# Patient Record
Sex: FEMALE
[De-identification: names, ages, dates, MRNs, and addresses within clinical notes are randomized per-mention and may not be internally consistent; named-entity substitution may affect disease eponyms.]

---

## 2017-01-05 ENCOUNTER — RX ONLY (RX ONLY)
Age: 47
End: 2017-01-05

## 2017-01-05 RX ORDER — SODIUM SULFACETAMIDE AND SULFUR 100; 20 MG/G; MG/G
SOLUTION TOPICAL
Qty: 1 | Refills: 3 | Status: ERX | COMMUNITY
Start: 2017-01-05

## 2017-01-05 RX ORDER — TAZAROTENE 1 MG/G
CREAM CUTANEOUS
Qty: 1 | Refills: 3 | Status: ERX | COMMUNITY
Start: 2017-01-05

## 2017-02-07 ENCOUNTER — APPOINTMENT (OUTPATIENT)
Age: 47
Setting detail: DERMATOLOGY
End: 2017-02-20

## 2017-02-07 DIAGNOSIS — L70.0 ACNE VULGARIS: ICD-10-CM

## 2017-02-07 DIAGNOSIS — L98.8 OTHER SPECIFIED DISORDERS OF THE SKIN AND SUBCUTANEOUS TISSUE: ICD-10-CM

## 2017-02-07 PROCEDURE — OTHER ORDER TESTS: OTHER

## 2017-02-07 PROCEDURE — OTHER ISOTRETINOIN MONITORING: OTHER

## 2017-02-07 PROCEDURE — OTHER MIPS QUALITY: OTHER

## 2017-02-07 PROCEDURE — 81025 URINE PREGNANCY TEST: CPT

## 2017-02-07 PROCEDURE — OTHER URINE PREGNANCY TEST: OTHER

## 2017-02-07 PROCEDURE — OTHER COUNSELING: OTHER

## 2017-02-07 PROCEDURE — 99214 OFFICE O/P EST MOD 30 MIN: CPT

## 2017-02-07 ASSESSMENT — LOCATION DETAILED DESCRIPTION DERM: LOCATION DETAILED: LEFT INFERIOR MEDIAL FOREHEAD

## 2017-02-07 ASSESSMENT — LOCATION ZONE DERM: LOCATION ZONE: FACE

## 2017-02-07 ASSESSMENT — LOCATION SIMPLE DESCRIPTION DERM: LOCATION SIMPLE: LEFT FOREHEAD

## 2017-02-07 NOTE — PROCEDURE: MIPS QUALITY
Quality 110: Preventive Care And Screening: Influenza Immunization: Influenza immunization was not ordered or administered, reason not given
Quality 431: Preventive Care And Screening: Unhealthy Alcohol Use - Screening: Patient screened for unhealthy alcohol use using a single question and scores less than 2 times per year
Quality 226: Preventive Care And Screening: Tobacco Use: Screening And Cessation Intervention: Patient screened for tobacco and never smoked
Quality 130: Documentation Of Current Medications In The Medical Record: Current Medications Documented
Detail Level: Detailed

## 2017-02-07 NOTE — PROCEDURE: ISOTRETINOIN MONITORING
Most Recent Cbc (Optional): Wnl
Dosing Month 1 (Required For Cumulative Dosing): 40mg Daily
Most Recent Beta Hcg (Optional): Neg
Months Of Therapy Completed: 1
Pounds Preamble Statement (Weight Entered In Details Tab): Reported Weight in pounds:
Detail Level: Zone
Display Individual Monthly Dosage In The Note (If Yes Will Display All Dosages Which Are Not N/A): yes
Weight Units: pounds

## 2017-03-07 ENCOUNTER — APPOINTMENT (OUTPATIENT)
Age: 47
Setting detail: DERMATOLOGY
End: 2017-03-09

## 2017-03-07 DIAGNOSIS — L70.0 ACNE VULGARIS: ICD-10-CM

## 2017-03-07 PROCEDURE — 81025 URINE PREGNANCY TEST: CPT

## 2017-03-07 PROCEDURE — OTHER MIPS QUALITY: OTHER

## 2017-03-07 PROCEDURE — OTHER ORDER TESTS: OTHER

## 2017-03-07 PROCEDURE — OTHER COUNSELING: OTHER

## 2017-03-07 PROCEDURE — OTHER URINE PREGNANCY TEST: OTHER

## 2017-03-07 PROCEDURE — 99214 OFFICE O/P EST MOD 30 MIN: CPT

## 2017-03-07 PROCEDURE — OTHER ISOTRETINOIN MONITORING: OTHER

## 2017-03-07 ASSESSMENT — LOCATION ZONE DERM: LOCATION ZONE: FACE

## 2017-03-07 ASSESSMENT — LOCATION SIMPLE DESCRIPTION DERM: LOCATION SIMPLE: LEFT FOREHEAD

## 2017-03-07 ASSESSMENT — LOCATION DETAILED DESCRIPTION DERM: LOCATION DETAILED: LEFT INFERIOR MEDIAL FOREHEAD

## 2017-03-07 NOTE — PROCEDURE: MIPS QUALITY
Quality 431: Preventive Care And Screening: Unhealthy Alcohol Use - Screening: Patient screened for unhealthy alcohol use using a single question and scores less than 2 times per year
Quality 130: Documentation Of Current Medications In The Medical Record: Current Medications Documented
Quality 226: Preventive Care And Screening: Tobacco Use: Screening And Cessation Intervention: Patient screened for tobacco and never smoked
Detail Level: Detailed
Quality 110: Preventive Care And Screening: Influenza Immunization: Influenza Immunization Administered during Influenza season

## 2017-03-07 NOTE — PROCEDURE: ISOTRETINOIN MONITORING
Detail Level: Zone
Most Recent Cholesterol (Optional): Wnl
Pounds Preamble Statement (Weight Entered In Details Tab): Reported Weight in pounds:
Months Of Therapy Completed: 1
Most Recent Beta Hcg (Optional): Neg
Comments: Increase dose to 40 mg bid
Weight Units: pounds
Dosing Month 1 (Required For Cumulative Dosing): 40mg Daily
Display Individual Monthly Dosage In The Note (If Yes Will Display All Dosages Which Are Not N/A): yes

## 2017-04-11 ENCOUNTER — APPOINTMENT (OUTPATIENT)
Age: 47
Setting detail: DERMATOLOGY
End: 2017-04-20

## 2017-04-11 DIAGNOSIS — L27.1 LOCALIZED SKIN ERUPTION DUE TO DRUGS AND MEDICAMENTS TAKEN INTERNALLY: ICD-10-CM

## 2017-04-11 DIAGNOSIS — L70.0 ACNE VULGARIS: ICD-10-CM

## 2017-04-11 PROCEDURE — 99213 OFFICE O/P EST LOW 20 MIN: CPT

## 2017-04-11 PROCEDURE — OTHER ORDER TESTS: OTHER

## 2017-04-11 PROCEDURE — OTHER PRESCRIPTION: OTHER

## 2017-04-11 PROCEDURE — OTHER COUNSELING: OTHER

## 2017-04-11 PROCEDURE — OTHER ISOTRETINOIN MONITORING: OTHER

## 2017-04-11 PROCEDURE — OTHER MIPS QUALITY: OTHER

## 2017-04-11 RX ORDER — TRIAMCINOLONE ACETONIDE 1 MG/G
CREAM TOPICAL
Qty: 30 | Refills: 3 | Status: ERX | COMMUNITY
Start: 2017-04-11

## 2017-04-11 NOTE — PROCEDURE: MIPS QUALITY
Detail Level: Detailed
Quality 226: Preventive Care And Screening: Tobacco Use: Screening And Cessation Intervention: Patient screened for tobacco and never smoked
Quality 431: Preventive Care And Screening: Unhealthy Alcohol Use - Screening: Patient screened for unhealthy alcohol use using a single question and scores less than 2 times per year
Quality 110: Preventive Care And Screening: Influenza Immunization: Influenza Immunization Administered during Influenza season
Quality 130: Documentation Of Current Medications In The Medical Record: Current Medications Documented

## 2017-04-11 NOTE — PROCEDURE: ISOTRETINOIN MONITORING
Months Of Therapy Completed: 2
Dosing Month 1 (Required For Cumulative Dosing): 40mg Daily
Most Recent Lfts (Optional): Wnl
Kilograms Preamble Statement (Weight Entered In Details Tab): Reported Weight in pounds:
Display Individual Monthly Dosage In The Note (If Yes Will Display All Dosages Which Are Not N/A): no
Most Recent Beta Hcg (Optional): Negative
Dosing Month 2 (Required For Cumulative Dosing): 80mg Daily
Detail Level: Zone
Weight Units: pounds

## 2017-04-12 RX ORDER — ISOTRETINOIN 40 MG/1
CAPSULE, LIQUID FILLED ORAL
Qty: 60 | Refills: 0 | Status: ERX | COMMUNITY
Start: 2017-04-12

## 2017-05-09 ENCOUNTER — APPOINTMENT (OUTPATIENT)
Age: 47
Setting detail: DERMATOLOGY
End: 2017-05-10

## 2017-05-09 DIAGNOSIS — L70.0 ACNE VULGARIS: ICD-10-CM

## 2017-05-09 PROCEDURE — OTHER ISOTRETINOIN MONITORING: OTHER

## 2017-05-09 PROCEDURE — OTHER MIPS QUALITY: OTHER

## 2017-05-09 PROCEDURE — OTHER COUNSELING: OTHER

## 2017-05-09 PROCEDURE — OTHER PRESCRIPTION: OTHER

## 2017-05-09 PROCEDURE — 99213 OFFICE O/P EST LOW 20 MIN: CPT

## 2017-05-09 RX ORDER — ISOTRETINOIN 40 MG/1
CAPSULE, LIQUID FILLED ORAL
Qty: 60 | Refills: 0 | Status: ERX

## 2017-05-09 NOTE — PROCEDURE: ISOTRETINOIN MONITORING
Most Recent Cbc (Optional): Wnl
Dosing Month 2 (Required For Cumulative Dosing): 80mg Daily
Weight Units: pounds
Display Individual Monthly Dosage In The Note (If Yes Will Display All Dosages Which Are Not N/A): no
Dosing Month 1 (Required For Cumulative Dosing): 40mg Daily
Kilograms Preamble Statement (Weight Entered In Details Tab): Reported Weight in pounds:
Detail Level: Zone
Most Recent Beta Hcg (Optional): Negative
Months Of Therapy Completed: 3

## 2017-05-09 NOTE — PROCEDURE: MIPS QUALITY
Quality 134: Screening For Clinical Depression And Follow-Up Plan: Depression Screening not documented, reason not given
Quality 130: Documentation Of Current Medications In The Medical Record: Current Medications Documented
Quality 155: Falls Plan Of Care: Plan of Care not Documented, Reason not Otherwise Specified
Quality 154 Part A: Falls: Risk Assessment (Should Be Reported With Measure 155.): Falls risk assessment not completed, reason not otherwise specified
Quality 110: Preventive Care And Screening: Influenza Immunization: Influenza Immunization Administered during Influenza season
Quality 226: Preventive Care And Screening: Tobacco Use: Screening And Cessation Intervention: Patient screened for tobacco and never smoked
Detail Level: Detailed
Quality 431: Preventive Care And Screening: Unhealthy Alcohol Use - Screening: Patient screened for unhealthy alcohol use using a single question and scores less than 2 times per year

## 2017-06-06 ENCOUNTER — APPOINTMENT (OUTPATIENT)
Age: 47
Setting detail: DERMATOLOGY
End: 2017-06-08

## 2017-06-06 DIAGNOSIS — L70.0 ACNE VULGARIS: ICD-10-CM

## 2017-06-06 PROCEDURE — OTHER PRESCRIPTION: OTHER

## 2017-06-06 PROCEDURE — 99213 OFFICE O/P EST LOW 20 MIN: CPT

## 2017-06-06 PROCEDURE — OTHER MIPS QUALITY: OTHER

## 2017-06-06 PROCEDURE — OTHER COUNSELING: OTHER

## 2017-06-06 PROCEDURE — OTHER URINE PREGNANCY TEST: OTHER

## 2017-06-06 PROCEDURE — OTHER ISOTRETINOIN MONITORING: OTHER

## 2017-06-06 PROCEDURE — 81025 URINE PREGNANCY TEST: CPT

## 2017-06-06 RX ORDER — ISOTRETINOIN 40 MG/1
CAPSULE, LIQUID FILLED ORAL
Qty: 60 | Refills: 0 | Status: ERX

## 2017-06-06 NOTE — PROCEDURE: ISOTRETINOIN MONITORING
Dosing Month 1 (Required For Cumulative Dosing): 40mg Daily
Most Recent Cbc (Optional): Wnl
Kilograms Preamble Statement (Weight Entered In Details Tab): Reported Weight in pounds:
Dosing Month 4 (Required For Cumulative Dosing): 80mg Daily
Weight Units: pounds
Months Of Therapy Completed: 4
Most Recent Beta Hcg (Optional): Negative
Display Individual Monthly Dosage In The Note (If Yes Will Display All Dosages Which Are Not N/A): no
Detail Level: Zone

## 2017-06-06 NOTE — HPI: MEDICATION (ISOTRETINOIN)
How Severe Is It?: mild
Is This A New Presentation, Or A Follow-Up?: Follow Up Isotretinoin
Females Only: When Was Your Last Menstrual Period?: 06/01/1

## 2017-06-06 NOTE — PROCEDURE: MIPS QUALITY
Quality 110: Preventive Care And Screening: Influenza Immunization: Influenza Immunization Administered during Influenza season
Detail Level: Detailed
Quality 155: Falls Plan Of Care: Plan of Care not Documented, Reason not Otherwise Specified
Quality 134: Screening For Clinical Depression And Follow-Up Plan: Depression Screening not documented, reason not given
Quality 154 Part A: Falls: Risk Assessment (Should Be Reported With Measure 155.): Falls risk assessment not completed, reason not otherwise specified
Quality 226: Preventive Care And Screening: Tobacco Use: Screening And Cessation Intervention: Patient screened for tobacco and never smoked
Quality 431: Preventive Care And Screening: Unhealthy Alcohol Use - Screening: Patient screened for unhealthy alcohol use using a single question and scores less than 2 times per year
Quality 130: Documentation Of Current Medications In The Medical Record: Current Medications Documented

## 2017-07-18 ENCOUNTER — APPOINTMENT (OUTPATIENT)
Age: 47
Setting detail: DERMATOLOGY
End: 2017-07-25

## 2017-07-18 DIAGNOSIS — L81.0 POSTINFLAMMATORY HYPERPIGMENTATION: ICD-10-CM

## 2017-07-18 DIAGNOSIS — L70.0 ACNE VULGARIS: ICD-10-CM

## 2017-07-18 PROCEDURE — 99214 OFFICE O/P EST MOD 30 MIN: CPT

## 2017-07-18 PROCEDURE — OTHER COUNSELING: OTHER

## 2017-07-18 PROCEDURE — OTHER URINE PREGNANCY TEST: OTHER

## 2017-07-18 PROCEDURE — 81025 URINE PREGNANCY TEST: CPT

## 2017-07-18 PROCEDURE — OTHER ISOTRETINOIN MONITORING: OTHER

## 2017-07-18 PROCEDURE — OTHER MIPS QUALITY: OTHER

## 2017-07-18 ASSESSMENT — LOCATION SIMPLE DESCRIPTION DERM: LOCATION SIMPLE: LEFT ANTERIOR NECK

## 2017-07-18 ASSESSMENT — LOCATION ZONE DERM: LOCATION ZONE: NECK

## 2017-07-18 ASSESSMENT — LOCATION DETAILED DESCRIPTION DERM: LOCATION DETAILED: LEFT INFERIOR LATERAL NECK

## 2017-07-18 NOTE — PROCEDURE: MIPS QUALITY
Quality 130: Documentation Of Current Medications In The Medical Record: Current Medications Documented
Quality 110: Preventive Care And Screening: Influenza Immunization: Influenza Immunization Administered during Influenza season
Detail Level: Detailed
Quality 155: Falls Plan Of Care: Plan of Care not Documented, Reason not Otherwise Specified
Quality 154 Part A: Falls: Risk Assessment (Should Be Reported With Measure 155.): Falls risk assessment not completed, reason not otherwise specified
Quality 226: Preventive Care And Screening: Tobacco Use: Screening And Cessation Intervention: Patient screened for tobacco and never smoked
Quality 431: Preventive Care And Screening: Unhealthy Alcohol Use - Screening: Patient screened for unhealthy alcohol use using a single question and scores less than 2 times per year
Quality 134: Screening For Clinical Depression And Follow-Up Plan: Depression Screening not documented, reason not given

## 2017-07-18 NOTE — PROCEDURE: ISOTRETINOIN MONITORING
Female Completion Statement: After discussing her treatment course we decided to discontinue isotretinoin therapy at this time. I explained that she would need to continue her birth control methods for at least one month after the last dosage. She should also get a pregnancy test one month after the last dose. She shouldn't donate blood for one month after the last dose. She should call with any new symptoms of depression.
Dosing Month 5 (Required For Cumulative Dosing): 80mg Daily
Months Of Therapy Completed: 5
Most Recent Beta Hcg (Optional): Negative
Male Completion Statement: After discussing his treatment course we decided to discontinue isotretinoin therapy at this time. He shouldn't donate blood for one month after the last dose. He should call with any new symptoms of depression.
Completed Therapy?: Yes- Female
Most Recent Lfts (Optional): Wnl
Detail Level: Zone
Display Individual Monthly Dosage In The Note (If Yes Will Display All Dosages Which Are Not N/A): no
Weight Units: pounds
Dosing Month 1 (Required For Cumulative Dosing): 40mg Daily
Kilograms Preamble Statement (Weight Entered In Details Tab): Reported Weight in pounds:
Kilograms Preamble Statement (Weight Entered In Details Tab): Reported Weight in kilograms:

## 2017-08-24 ENCOUNTER — APPOINTMENT (OUTPATIENT)
Age: 47
Setting detail: DERMATOLOGY
End: 2017-08-30

## 2017-08-24 DIAGNOSIS — L70.0 ACNE VULGARIS: ICD-10-CM

## 2017-08-24 DIAGNOSIS — L81.0 POSTINFLAMMATORY HYPERPIGMENTATION: ICD-10-CM

## 2017-08-24 PROCEDURE — OTHER COUNSELING: OTHER

## 2017-08-24 PROCEDURE — 99213 OFFICE O/P EST LOW 20 MIN: CPT

## 2017-08-24 PROCEDURE — OTHER URINE PREGNANCY TEST: OTHER

## 2017-08-24 PROCEDURE — OTHER DIAGNOSIS COMMENT: OTHER

## 2017-08-24 PROCEDURE — OTHER MIPS QUALITY: OTHER

## 2017-08-24 PROCEDURE — 81025 URINE PREGNANCY TEST: CPT

## 2017-08-24 ASSESSMENT — LOCATION SIMPLE DESCRIPTION DERM
LOCATION SIMPLE: LEFT FOREARM
LOCATION SIMPLE: LEFT EAR
LOCATION SIMPLE: RIGHT FOREARM

## 2017-08-24 ASSESSMENT — LOCATION ZONE DERM
LOCATION ZONE: ARM
LOCATION ZONE: EAR

## 2017-08-24 ASSESSMENT — LOCATION DETAILED DESCRIPTION DERM
LOCATION DETAILED: LEFT ANTERIOR EARLOBE
LOCATION DETAILED: RIGHT DISTAL DORSAL FOREARM
LOCATION DETAILED: RIGHT PROXIMAL DORSAL FOREARM
LOCATION DETAILED: LEFT DISTAL DORSAL FOREARM

## 2017-08-24 NOTE — PROCEDURE: MIPS QUALITY
Quality 134: Screening For Clinical Depression And Follow-Up Plan: Depression Screening not documented, reason not given
Quality 130: Documentation Of Current Medications In The Medical Record: Current Medications Documented
Quality 110: Preventive Care And Screening: Influenza Immunization: Influenza Immunization Administered during Influenza season
Quality 226: Preventive Care And Screening: Tobacco Use: Screening And Cessation Intervention: Patient screened for tobacco and never smoked
Quality 155: Falls Plan Of Care: Plan of Care not Documented, Reason not Otherwise Specified
Quality 154 Part A: Falls: Risk Assessment (Should Be Reported With Measure 155.): Falls risk assessment not completed, reason not otherwise specified
Quality 431: Preventive Care And Screening: Unhealthy Alcohol Use - Screening: Patient screened for unhealthy alcohol use using a single question and scores less than 2 times per year
Detail Level: Detailed

## 2017-08-24 NOTE — PROCEDURE: DIAGNOSIS COMMENT
Comment: Prescription for dyschromia compound sent to [facility] pharmacy. Patient given instructions to fill prescription. Plan to re check in 6-8 weeks Comment: Prescription for dyschromia compound sent to [facility] pharmacy. Patient given instructions to fill prescription. Plan to re check in 6-8 weeks

## 2017-09-01 RX ORDER — ISOTRETINOIN 40 MG/1
CAPSULE, LIQUID FILLED ORAL
Qty: 60 | Refills: 0 | Status: ERX

## 2017-12-19 ENCOUNTER — APPOINTMENT (OUTPATIENT)
Age: 47
Setting detail: DERMATOLOGY
End: 2017-12-28

## 2017-12-19 DIAGNOSIS — L81.0 POSTINFLAMMATORY HYPERPIGMENTATION: ICD-10-CM

## 2017-12-19 DIAGNOSIS — L70.0 ACNE VULGARIS: ICD-10-CM

## 2017-12-19 PROCEDURE — OTHER MIPS QUALITY: OTHER

## 2017-12-19 PROCEDURE — OTHER COUNSELING: OTHER

## 2017-12-19 PROCEDURE — OTHER PRESCRIPTION: OTHER

## 2017-12-19 PROCEDURE — 99213 OFFICE O/P EST LOW 20 MIN: CPT

## 2017-12-19 RX ORDER — AZELAIC ACID 0.2 G/G
CREAM CUTANEOUS
Qty: 30 | Refills: 4 | Status: ERX | COMMUNITY
Start: 2017-12-19

## 2017-12-19 ASSESSMENT — LOCATION DETAILED DESCRIPTION DERM: LOCATION DETAILED: RIGHT INFERIOR CENTRAL MALAR CHEEK

## 2017-12-19 ASSESSMENT — LOCATION ZONE DERM: LOCATION ZONE: FACE

## 2017-12-19 ASSESSMENT — LOCATION SIMPLE DESCRIPTION DERM: LOCATION SIMPLE: RIGHT CHEEK

## 2017-12-19 NOTE — PROCEDURE: MIPS QUALITY
Quality 110: Preventive Care And Screening: Influenza Immunization: Influenza Immunization Administered during Influenza season
Quality 226: Preventive Care And Screening: Tobacco Use: Screening And Cessation Intervention: Patient screened for tobacco and never smoked
Quality 134: Screening For Clinical Depression And Follow-Up Plan: Depression Screening not documented, reason not given
Quality 130: Documentation Of Current Medications In The Medical Record: Current Medications Documented
Quality 431: Preventive Care And Screening: Unhealthy Alcohol Use - Screening: Patient screened for unhealthy alcohol use using a single question and scores less than 2 times per year
Quality 154 Part A: Falls: Risk Assessment (Should Be Reported With Measure 155.): Falls risk assessment not completed, reason not otherwise specified
Detail Level: Detailed
Quality 155: Falls Plan Of Care: Plan of Care not Documented, Reason not Otherwise Specified

## 2018-02-21 ENCOUNTER — APPOINTMENT (OUTPATIENT)
Age: 48
Setting detail: DERMATOLOGY
End: 2018-02-26

## 2018-02-21 DIAGNOSIS — L81.0 POSTINFLAMMATORY HYPERPIGMENTATION: ICD-10-CM

## 2018-02-21 PROCEDURE — 99213 OFFICE O/P EST LOW 20 MIN: CPT

## 2018-02-21 PROCEDURE — OTHER RECOMMENDATIONS: OTHER

## 2018-02-21 PROCEDURE — OTHER COUNSELING: OTHER

## 2018-02-21 PROCEDURE — OTHER MIPS QUALITY: OTHER

## 2018-02-21 ASSESSMENT — LOCATION DETAILED DESCRIPTION DERM: LOCATION DETAILED: RIGHT INFERIOR CENTRAL MALAR CHEEK

## 2018-02-21 ASSESSMENT — LOCATION ZONE DERM: LOCATION ZONE: FACE

## 2018-02-21 ASSESSMENT — LOCATION SIMPLE DESCRIPTION DERM: LOCATION SIMPLE: RIGHT CHEEK

## 2018-02-21 NOTE — PROCEDURE: RECOMMENDATIONS
Detail Level: Zone
Recommendation Preamble: The following recommendations were made during visit:
Recommendations (Free Text): Continue current treatment

## 2018-02-21 NOTE — PROCEDURE: MIPS QUALITY
Quality 431: Preventive Care And Screening: Unhealthy Alcohol Use - Screening: Patient screened for unhealthy alcohol use using a single question and scores less than 2 times per year
Quality 130: Documentation Of Current Medications In The Medical Record: Current Medications Documented
Quality 154 Part A: Falls: Risk Assessment (Should Be Reported With Measure 155.): Falls risk assessment not completed, reason not otherwise specified
Quality 155: Falls Plan Of Care: Plan of Care not Documented, Reason not Otherwise Specified
Quality 226: Preventive Care And Screening: Tobacco Use: Screening And Cessation Intervention: Patient screened for tobacco and never smoked
Quality 110: Preventive Care And Screening: Influenza Immunization: Influenza Immunization Administered during Influenza season
Quality 134: Screening For Clinical Depression And Follow-Up Plan: Depression Screening not documented, reason not given
Detail Level: Detailed

## 2018-08-09 ENCOUNTER — APPOINTMENT (OUTPATIENT)
Age: 48
Setting detail: DERMATOLOGY
End: 2018-08-13

## 2018-08-09 DIAGNOSIS — Z71.89 OTHER SPECIFIED COUNSELING: ICD-10-CM

## 2018-08-09 DIAGNOSIS — L81.0 POSTINFLAMMATORY HYPERPIGMENTATION: ICD-10-CM

## 2018-08-09 PROCEDURE — OTHER MIPS QUALITY: OTHER

## 2018-08-09 PROCEDURE — 99213 OFFICE O/P EST LOW 20 MIN: CPT

## 2018-08-09 PROCEDURE — OTHER COUNSELING: OTHER

## 2018-08-09 ASSESSMENT — LOCATION DETAILED DESCRIPTION DERM: LOCATION DETAILED: RIGHT INFERIOR CENTRAL MALAR CHEEK

## 2018-08-09 ASSESSMENT — LOCATION ZONE DERM: LOCATION ZONE: FACE

## 2018-08-09 ASSESSMENT — LOCATION SIMPLE DESCRIPTION DERM: LOCATION SIMPLE: RIGHT CHEEK

## 2018-08-09 NOTE — PROCEDURE: MIPS QUALITY
Quality 155: Falls Plan Of Care: Plan of Care not Documented, Reason not Otherwise Specified
Quality 130: Documentation Of Current Medications In The Medical Record: Current Medications Documented
Quality 134: Screening For Clinical Depression And Follow-Up Plan: Depression Screening not documented, reason not given
Quality 226: Preventive Care And Screening: Tobacco Use: Screening And Cessation Intervention: Patient screened for tobacco and never smoked
Detail Level: Detailed
Quality 431: Preventive Care And Screening: Unhealthy Alcohol Use - Screening: Patient screened for unhealthy alcohol use using a single question and scores less than 2 times per year
Quality 110: Preventive Care And Screening: Influenza Immunization: Influenza Immunization Administered during Influenza season
Quality 154 Part A: Falls: Risk Assessment (Should Be Reported With Measure 155.): Falls risk assessment not completed, reason not otherwise specified

## 2018-09-05 ENCOUNTER — RX ONLY (RX ONLY)
Age: 48
End: 2018-09-05

## 2018-09-05 RX ORDER — TRETINOIN 0.5 MG/G
CREAM TOPICAL
Qty: 1 | Refills: 3 | Status: ERX | COMMUNITY
Start: 2018-09-05

## 2018-12-31 ENCOUNTER — APPOINTMENT (OUTPATIENT)
Age: 48
Setting detail: DERMATOLOGY
End: 2018-12-31

## 2018-12-31 DIAGNOSIS — L259 CONTACT DERMATITIS AND OTHER ECZEMA, UNSPECIFIED CAUSE: ICD-10-CM

## 2018-12-31 DIAGNOSIS — L65.8 OTHER SPECIFIED NONSCARRING HAIR LOSS: ICD-10-CM

## 2018-12-31 DIAGNOSIS — L81.4 OTHER MELANIN HYPERPIGMENTATION: ICD-10-CM

## 2018-12-31 PROBLEM — L23.9 ALLERGIC CONTACT DERMATITIS, UNSPECIFIED CAUSE: Status: ACTIVE | Noted: 2018-12-31

## 2018-12-31 PROCEDURE — OTHER DIAGNOSIS COMMENT: OTHER

## 2018-12-31 PROCEDURE — OTHER COUNSELING: OTHER

## 2018-12-31 PROCEDURE — 99214 OFFICE O/P EST MOD 30 MIN: CPT

## 2018-12-31 PROCEDURE — OTHER MIPS QUALITY: OTHER

## 2018-12-31 PROCEDURE — OTHER PRESCRIPTION: OTHER

## 2018-12-31 RX ORDER — HYDROQUINONE 40 MG/G
CREAM TOPICAL
Qty: 30 | Refills: 3 | COMMUNITY
Start: 2018-12-31

## 2018-12-31 RX ORDER — BIMATOPROST 0.3 MG/ML
SOLUTION/ DROPS OPHTHALMIC
Qty: 1 | Refills: 3 | COMMUNITY
Start: 2018-12-31

## 2018-12-31 NOTE — PROCEDURE: MIPS QUALITY
Quality 226: Preventive Care And Screening: Tobacco Use: Screening And Cessation Intervention: Patient screened for tobacco and never smoked
Quality 110: Preventive Care And Screening: Influenza Immunization: Influenza Immunization Administered during Influenza season
Quality 130: Documentation Of Current Medications In The Medical Record: Current Medications Documented
Quality 154 Part A: Falls: Risk Assessment (Should Be Reported With Measure 155.): Falls risk assessment not completed, reason not otherwise specified
Quality 431: Preventive Care And Screening: Unhealthy Alcohol Use - Screening: Patient screened for unhealthy alcohol use using a single question and scores less than 2 times per year
Quality 155: Falls Plan Of Care: Plan of Care not Documented, Reason not Otherwise Specified
Quality 134: Screening For Clinical Depression And Follow-Up Plan: Depression Screening not documented, reason not given
Detail Level: Detailed